# Patient Record
Sex: MALE
[De-identification: names, ages, dates, MRNs, and addresses within clinical notes are randomized per-mention and may not be internally consistent; named-entity substitution may affect disease eponyms.]

---

## 2022-07-30 ENCOUNTER — TELEPHONE ENCOUNTER (OUTPATIENT)
Age: 67
End: 2022-07-30

## 2022-07-31 ENCOUNTER — TELEPHONE ENCOUNTER (OUTPATIENT)
Age: 67
End: 2022-07-31

## 2022-11-30 ENCOUNTER — TELEPHONE ENCOUNTER (OUTPATIENT)
Dept: URBAN - METROPOLITAN AREA CLINIC 7 | Facility: CLINIC | Age: 67
End: 2022-11-30

## 2022-12-05 ENCOUNTER — LAB OUTSIDE AN ENCOUNTER (OUTPATIENT)
Dept: URBAN - METROPOLITAN AREA CLINIC 7 | Facility: CLINIC | Age: 67
End: 2022-12-05

## 2022-12-16 ENCOUNTER — TELEPHONE ENCOUNTER (OUTPATIENT)
Dept: URBAN - METROPOLITAN AREA CLINIC 7 | Facility: CLINIC | Age: 67
End: 2022-12-16

## 2023-01-06 ENCOUNTER — OFFICE VISIT (OUTPATIENT)
Dept: URBAN - METROPOLITAN AREA SURGERY CENTER 5 | Facility: SURGERY CENTER | Age: 68
End: 2023-01-06

## 2023-01-19 ENCOUNTER — OFFICE VISIT (OUTPATIENT)
Dept: URBAN - METROPOLITAN AREA SURGERY CENTER 5 | Facility: SURGERY CENTER | Age: 68
End: 2023-01-19

## 2023-01-31 ENCOUNTER — TELEPHONE ENCOUNTER (OUTPATIENT)
Dept: URBAN - METROPOLITAN AREA CLINIC 7 | Facility: CLINIC | Age: 68
End: 2023-01-31

## 2023-02-09 ENCOUNTER — OFFICE VISIT (OUTPATIENT)
Dept: URBAN - METROPOLITAN AREA SURGERY CENTER 5 | Facility: SURGERY CENTER | Age: 68
End: 2023-02-09

## 2023-03-22 ENCOUNTER — TELEPHONE ENCOUNTER (OUTPATIENT)
Dept: URBAN - METROPOLITAN AREA CLINIC 7 | Facility: CLINIC | Age: 68
End: 2023-03-22

## 2023-03-30 ENCOUNTER — OFFICE VISIT (OUTPATIENT)
Dept: URBAN - METROPOLITAN AREA SURGERY CENTER 5 | Facility: SURGERY CENTER | Age: 68
End: 2023-03-30

## 2023-07-17 ENCOUNTER — TELEPHONE ENCOUNTER (OUTPATIENT)
Dept: URBAN - METROPOLITAN AREA CLINIC 7 | Facility: CLINIC | Age: 68
End: 2023-07-17

## 2023-08-03 ENCOUNTER — TELEPHONE ENCOUNTER (OUTPATIENT)
Dept: URBAN - METROPOLITAN AREA CLINIC 7 | Facility: CLINIC | Age: 68
End: 2023-08-03

## 2024-10-01 ENCOUNTER — HOSPITAL ENCOUNTER (EMERGENCY)
Facility: HOSPITAL | Age: 69
Discharge: HOME | End: 2024-10-01
Attending: STUDENT IN AN ORGANIZED HEALTH CARE EDUCATION/TRAINING PROGRAM | Admitting: STUDENT IN AN ORGANIZED HEALTH CARE EDUCATION/TRAINING PROGRAM
Payer: COMMERCIAL

## 2024-10-01 ENCOUNTER — APPOINTMENT (EMERGENCY)
Dept: RADIOLOGY | Facility: HOSPITAL | Age: 69
End: 2024-10-01
Attending: STUDENT IN AN ORGANIZED HEALTH CARE EDUCATION/TRAINING PROGRAM
Payer: COMMERCIAL

## 2024-10-01 VITALS
HEART RATE: 98 BPM | TEMPERATURE: 99.3 F | DIASTOLIC BLOOD PRESSURE: 64 MMHG | OXYGEN SATURATION: 16 % | RESPIRATION RATE: 20 BRPM | SYSTOLIC BLOOD PRESSURE: 148 MMHG

## 2024-10-01 DIAGNOSIS — N39.0 URINARY TRACT INFECTION WITH HEMATURIA, SITE UNSPECIFIED: Primary | ICD-10-CM

## 2024-10-01 DIAGNOSIS — R31.9 URINARY TRACT INFECTION WITH HEMATURIA, SITE UNSPECIFIED: Primary | ICD-10-CM

## 2024-10-01 LAB
ALBUMIN SERPL-MCNC: 4.4 G/DL (ref 3.5–5.7)
ALP SERPL-CCNC: 46 IU/L (ref 34–125)
ALT SERPL-CCNC: 20 IU/L (ref 7–52)
AMPHET UR QL SCN: NOT DETECTED
ANION GAP SERPL CALC-SCNC: 7 MEQ/L (ref 3–15)
APAP SERPL-MCNC: <0.1 UG/ML (ref 10–30)
APTT PPP: <22 SEC (ref 23–35)
AST SERPL-CCNC: 20 IU/L (ref 13–39)
BACTERIA URNS QL MICRO: ABNORMAL /HPF
BARBITURATES UR QL SCN: NOT DETECTED
BASOPHILS # BLD: 0.02 K/UL (ref 0.01–0.1)
BASOPHILS NFR BLD: 0.2 %
BENZODIAZ UR QL SCN: NOT DETECTED
BILIRUB SERPL-MCNC: 0.6 MG/DL (ref 0.3–1.2)
BILIRUB UR QL STRIP.AUTO: NEGATIVE MG/DL
BUN SERPL-MCNC: 17 MG/DL (ref 7–25)
CALCIUM SERPL-MCNC: 9.2 MG/DL (ref 8.6–10.3)
CANNABINOIDS UR QL SCN: NOT DETECTED
CHLORIDE SERPL-SCNC: 103 MEQ/L (ref 98–107)
CLARITY UR REFRACT.AUTO: ABNORMAL
CO2 SERPL-SCNC: 26 MEQ/L (ref 21–31)
COCAINE UR QL SCN: NOT DETECTED
COLOR UR AUTO: ABNORMAL
CREAT SERPL-MCNC: 1 MG/DL (ref 0.7–1.3)
DIFFERENTIAL METHOD BLD: ABNORMAL
EGFRCR SERPLBLD CKD-EPI 2021: >60 ML/MIN/1.73M*2
EOSINOPHIL # BLD: 0.1 K/UL (ref 0.04–0.54)
EOSINOPHIL NFR BLD: 1 %
ERYTHROCYTE [DISTWIDTH] IN BLOOD BY AUTOMATED COUNT: 13.1 % (ref 11.6–14.4)
ETHANOL SERPL-MCNC: <10 MG/DL
FENTANYL URINE SCR: NOT DETECTED
FLUAV RNA SPEC QL NAA+PROBE: NEGATIVE
FLUBV RNA SPEC QL NAA+PROBE: NEGATIVE
GLUCOSE BLD-MCNC: 122 MG/DL (ref 70–99)
GLUCOSE SERPL-MCNC: 117 MG/DL (ref 70–99)
GLUCOSE UR STRIP.AUTO-MCNC: NEGATIVE MG/DL
HCT VFR BLD AUTO: 40.1 % (ref 40.1–51)
HGB BLD-MCNC: 13.3 G/DL (ref 13.7–17.5)
HGB UR QL STRIP.AUTO: 3
HYALINE CASTS #/AREA URNS LPF: ABNORMAL /LPF
IMM GRANULOCYTES # BLD AUTO: 0.05 K/UL (ref 0–0.08)
IMM GRANULOCYTES NFR BLD AUTO: 0.5 %
INR PPP: 0.9
KETONES UR STRIP.AUTO-MCNC: NEGATIVE MG/DL
LACTATE SERPL-SCNC: 1.3 MMOL/L (ref 0.4–2)
LEUKOCYTE ESTERASE UR QL STRIP.AUTO: 3
LIPASE SERPL-CCNC: 12 U/L (ref 11–82)
LYMPHOCYTES # BLD: 1.04 K/UL (ref 1.2–3.5)
LYMPHOCYTES NFR BLD: 10.9 %
MAGNESIUM SERPL-MCNC: 1.9 MG/DL (ref 1.8–2.5)
MCH RBC QN AUTO: 30 PG (ref 28–33.2)
MCHC RBC AUTO-ENTMCNC: 33.2 G/DL (ref 32.2–36.5)
MCV RBC AUTO: 90.5 FL (ref 83–98)
MONOCYTES # BLD: 0.03 K/UL (ref 0.3–1)
MONOCYTES NFR BLD: 0.3 %
MUCOUS THREADS URNS QL MICRO: ABNORMAL /LPF
NEUTROPHILS # BLD: 8.32 K/UL (ref 1.7–7)
NEUTS SEG NFR BLD: 87.1 %
NITRITE UR QL STRIP.AUTO: NEGATIVE
NRBC BLD-RTO: 0 %
OPIATES UR QL SCN: NOT DETECTED
PCP UR QL SCN: NOT DETECTED
PDW BLD AUTO: 9.5 FL (ref 9.4–12.4)
PH UR STRIP.AUTO: 7.5 [PH]
PLATELET # BLD AUTO: 243 K/UL (ref 150–350)
POCT TEST: ABNORMAL
POTASSIUM SERPL-SCNC: 3.9 MEQ/L (ref 3.5–5.1)
PROT SERPL-MCNC: 7.1 G/DL (ref 6–8.2)
PROT UR QL STRIP.AUTO: 1
PROTHROMBIN TIME: 12.5 SEC (ref 12.2–14.5)
RBC # BLD AUTO: 4.43 M/UL (ref 4.5–5.8)
RBC #/AREA URNS HPF: ABNORMAL /HPF
RSV RNA SPEC QL NAA+PROBE: NEGATIVE
SALICYLATES SERPL-MCNC: <1.5 MG/DL
SARS-COV-2 RNA RESP QL NAA+PROBE: NEGATIVE
SODIUM SERPL-SCNC: 136 MEQ/L (ref 136–145)
SP GR UR REFRACT.AUTO: 1.01
SQUAMOUS URNS QL MICRO: ABNORMAL /HPF
TROPONIN I SERPL HS-MCNC: 12.1 PG/ML
TROPONIN I SERPL HS-MCNC: 14 PG/ML
UROBILINOGEN UR STRIP-ACNC: 0.2 EU/DL
WBC # BLD AUTO: 9.56 K/UL (ref 3.8–10.5)
WBC #/AREA URNS HPF: ABNORMAL /HPF

## 2024-10-01 PROCEDURE — 96366 THER/PROPH/DIAG IV INF ADDON: CPT

## 2024-10-01 PROCEDURE — 83690 ASSAY OF LIPASE: CPT | Performed by: STUDENT IN AN ORGANIZED HEALTH CARE EDUCATION/TRAINING PROGRAM

## 2024-10-01 PROCEDURE — 80053 COMPREHEN METABOLIC PANEL: CPT | Performed by: STUDENT IN AN ORGANIZED HEALTH CARE EDUCATION/TRAINING PROGRAM

## 2024-10-01 PROCEDURE — 93005 ELECTROCARDIOGRAM TRACING: CPT | Performed by: STUDENT IN AN ORGANIZED HEALTH CARE EDUCATION/TRAINING PROGRAM

## 2024-10-01 PROCEDURE — 83735 ASSAY OF MAGNESIUM: CPT | Performed by: STUDENT IN AN ORGANIZED HEALTH CARE EDUCATION/TRAINING PROGRAM

## 2024-10-01 PROCEDURE — 84484 ASSAY OF TROPONIN QUANT: CPT | Mod: 91 | Performed by: STUDENT IN AN ORGANIZED HEALTH CARE EDUCATION/TRAINING PROGRAM

## 2024-10-01 PROCEDURE — 71045 X-RAY EXAM CHEST 1 VIEW: CPT

## 2024-10-01 PROCEDURE — 85610 PROTHROMBIN TIME: CPT | Performed by: STUDENT IN AN ORGANIZED HEALTH CARE EDUCATION/TRAINING PROGRAM

## 2024-10-01 PROCEDURE — 84484 ASSAY OF TROPONIN QUANT: CPT | Performed by: STUDENT IN AN ORGANIZED HEALTH CARE EDUCATION/TRAINING PROGRAM

## 2024-10-01 PROCEDURE — 36415 COLL VENOUS BLD VENIPUNCTURE: CPT | Performed by: STUDENT IN AN ORGANIZED HEALTH CARE EDUCATION/TRAINING PROGRAM

## 2024-10-01 PROCEDURE — 83605 ASSAY OF LACTIC ACID: CPT | Performed by: STUDENT IN AN ORGANIZED HEALTH CARE EDUCATION/TRAINING PROGRAM

## 2024-10-01 PROCEDURE — G0480 DRUG TEST DEF 1-7 CLASSES: HCPCS | Performed by: STUDENT IN AN ORGANIZED HEALTH CARE EDUCATION/TRAINING PROGRAM

## 2024-10-01 PROCEDURE — 25800000 HC PHARMACY IV SOLUTIONS: Performed by: STUDENT IN AN ORGANIZED HEALTH CARE EDUCATION/TRAINING PROGRAM

## 2024-10-01 PROCEDURE — 87077 CULTURE AEROBIC IDENTIFY: CPT | Performed by: STUDENT IN AN ORGANIZED HEALTH CARE EDUCATION/TRAINING PROGRAM

## 2024-10-01 PROCEDURE — 3E0337Z INTRODUCTION OF ELECTROLYTIC AND WATER BALANCE SUBSTANCE INTO PERIPHERAL VEIN, PERCUTANEOUS APPROACH: ICD-10-PCS | Performed by: STUDENT IN AN ORGANIZED HEALTH CARE EDUCATION/TRAINING PROGRAM

## 2024-10-01 PROCEDURE — 96365 THER/PROPH/DIAG IV INF INIT: CPT

## 2024-10-01 PROCEDURE — 87040 BLOOD CULTURE FOR BACTERIA: CPT | Mod: 91 | Performed by: STUDENT IN AN ORGANIZED HEALTH CARE EDUCATION/TRAINING PROGRAM

## 2024-10-01 PROCEDURE — 3E033GC INTRODUCTION OF OTHER THERAPEUTIC SUBSTANCE INTO PERIPHERAL VEIN, PERCUTANEOUS APPROACH: ICD-10-PCS | Performed by: STUDENT IN AN ORGANIZED HEALTH CARE EDUCATION/TRAINING PROGRAM

## 2024-10-01 PROCEDURE — 85025 COMPLETE CBC W/AUTO DIFF WBC: CPT | Performed by: STUDENT IN AN ORGANIZED HEALTH CARE EDUCATION/TRAINING PROGRAM

## 2024-10-01 PROCEDURE — 85730 THROMBOPLASTIN TIME PARTIAL: CPT | Performed by: STUDENT IN AN ORGANIZED HEALTH CARE EDUCATION/TRAINING PROGRAM

## 2024-10-01 PROCEDURE — 3E03329 INTRODUCTION OF OTHER ANTI-INFECTIVE INTO PERIPHERAL VEIN, PERCUTANEOUS APPROACH: ICD-10-PCS | Performed by: STUDENT IN AN ORGANIZED HEALTH CARE EDUCATION/TRAINING PROGRAM

## 2024-10-01 PROCEDURE — 3E0333Z INTRODUCTION OF ANTI-INFLAMMATORY INTO PERIPHERAL VEIN, PERCUTANEOUS APPROACH: ICD-10-PCS | Performed by: STUDENT IN AN ORGANIZED HEALTH CARE EDUCATION/TRAINING PROGRAM

## 2024-10-01 PROCEDURE — 96361 HYDRATE IV INFUSION ADD-ON: CPT

## 2024-10-01 PROCEDURE — 96375 TX/PRO/DX INJ NEW DRUG ADDON: CPT

## 2024-10-01 PROCEDURE — 87637 SARSCOV2&INF A&B&RSV AMP PRB: CPT | Performed by: STUDENT IN AN ORGANIZED HEALTH CARE EDUCATION/TRAINING PROGRAM

## 2024-10-01 PROCEDURE — 99284 EMERGENCY DEPT VISIT MOD MDM: CPT | Mod: 25

## 2024-10-01 PROCEDURE — 63600000 HC DRUGS/DETAIL CODE: Performed by: STUDENT IN AN ORGANIZED HEALTH CARE EDUCATION/TRAINING PROGRAM

## 2024-10-01 PROCEDURE — 80307 DRUG TEST PRSMV CHEM ANLYZR: CPT | Performed by: STUDENT IN AN ORGANIZED HEALTH CARE EDUCATION/TRAINING PROGRAM

## 2024-10-01 PROCEDURE — 63700000 HC SELF-ADMINISTRABLE DRUG: Performed by: STUDENT IN AN ORGANIZED HEALTH CARE EDUCATION/TRAINING PROGRAM

## 2024-10-01 PROCEDURE — 81001 URINALYSIS AUTO W/SCOPE: CPT | Mod: 59 | Performed by: STUDENT IN AN ORGANIZED HEALTH CARE EDUCATION/TRAINING PROGRAM

## 2024-10-01 RX ORDER — KETOROLAC TROMETHAMINE 15 MG/ML
15 INJECTION, SOLUTION INTRAMUSCULAR; INTRAVENOUS ONCE
Status: COMPLETED | OUTPATIENT
Start: 2024-10-01 | End: 2024-10-01

## 2024-10-01 RX ORDER — ROSUVASTATIN CALCIUM 10 MG/1
10 TABLET, COATED ORAL DAILY
COMMUNITY

## 2024-10-01 RX ORDER — VERAPAMIL HCL 240 MG
360 TABLET, EXTENDED RELEASE ORAL NIGHTLY
COMMUNITY

## 2024-10-01 RX ORDER — LORAZEPAM 2 MG/ML
1 INJECTION INTRAMUSCULAR ONCE
Status: DISCONTINUED | OUTPATIENT
Start: 2024-10-01 | End: 2024-10-01

## 2024-10-01 RX ORDER — VALSARTAN AND HYDROCHLOROTHIAZIDE 320; 25 MG/1; MG/1
1 TABLET, FILM COATED ORAL DAILY
COMMUNITY

## 2024-10-01 RX ORDER — CEFUROXIME AXETIL 500 MG/1
500 TABLET ORAL 2 TIMES DAILY
Qty: 14 TABLET | Refills: 0 | Status: SHIPPED | OUTPATIENT
Start: 2024-10-01 | End: 2024-10-08

## 2024-10-01 RX ORDER — HYDROCHLOROTHIAZIDE 12.5 MG/1
12.5 CAPSULE ORAL DAILY
COMMUNITY

## 2024-10-01 RX ORDER — LORAZEPAM 2 MG/ML
0.5 INJECTION INTRAMUSCULAR ONCE
Status: COMPLETED | OUTPATIENT
Start: 2024-10-01 | End: 2024-10-01

## 2024-10-01 RX ORDER — ACETAMINOPHEN 325 MG/1
975 TABLET ORAL ONCE
Status: COMPLETED | OUTPATIENT
Start: 2024-10-01 | End: 2024-10-01

## 2024-10-01 RX ADMIN — SODIUM CHLORIDE 1000 ML: 9 INJECTION, SOLUTION INTRAVENOUS at 19:18

## 2024-10-01 RX ADMIN — LORAZEPAM 0.5 MG: 2 INJECTION INTRAMUSCULAR; INTRAVENOUS at 19:27

## 2024-10-01 RX ADMIN — KETOROLAC TROMETHAMINE 15 MG: 15 INJECTION, SOLUTION INTRAMUSCULAR; INTRAVENOUS at 20:30

## 2024-10-01 RX ADMIN — ACETAMINOPHEN 975 MG: 325 TABLET ORAL at 18:32

## 2024-10-01 RX ADMIN — CEFTRIAXONE SODIUM 1 G: 1 INJECTION, POWDER, FOR SOLUTION INTRAMUSCULAR; INTRAVENOUS at 20:29

## 2024-10-01 ASSESSMENT — ENCOUNTER SYMPTOMS
MYALGIAS: 0
LIGHT-HEADEDNESS: 1
DIZZINESS: 0
HEADACHES: 1
COUGH: 0
FEVER: 1
EYE REDNESS: 0
ADENOPATHY: 0
NECK PAIN: 0
PALPITATIONS: 0
BRUISES/BLEEDS EASILY: 0
DYSURIA: 0
NAUSEA: 0
EYE PAIN: 0
AGITATION: 0
TREMORS: 1
ABDOMINAL PAIN: 0
HEMATURIA: 1
SORE THROAT: 0
CHILLS: 1
SHORTNESS OF BREATH: 0
BACK PAIN: 0

## 2024-10-01 NOTE — ED PROVIDER NOTES
"Emergency Medicine Note  HPI   HISTORY OF PRESENT ILLNESS     68M comes to ED from [facility] via personal vehicle with feeling lightheaded and shaking. Pt provides HPI. States this morning he did have breakfast then he saw his urologist, Dr. Bobby Estevez, for cystoscopy in the office (no anesthesia) secondary to known BPH. Pt has been urinating blood but was told that would be expected. Pt was given Rx antibiotic Doxycycline but states he did not fill the prescription yet. States he did not eat lunch because of \"running around.\" Notes at 3pm did have a headache and took Ibuprofen at that time (did not check his temperature at that time). Was at dinner at [facility] with family when felt acutely lightheaded with tremors so came to ED for evaluation. In triage it was noted to have an abnormal ECG but pt with active rigors. Noted to have a fever of 100.5F. Pt with lightheadedness but denies any other focal discomfort or pain.           Patient History   PAST HISTORY     Reviewed from Nursing Triage:  Allergies       Past Medical History:   Diagnosis Date    Hypertension        No past surgical history on file.    No family history on file.    Social History     Tobacco Use    Smoking status: Never    Smokeless tobacco: Never   Substance Use Topics    Alcohol use: Yes    Drug use: Never         Review of Systems   REVIEW OF SYSTEMS     Review of Systems   Constitutional:  Positive for chills and fever.   HENT:  Negative for ear pain and sore throat.    Eyes:  Negative for pain and redness.   Respiratory:  Negative for cough and shortness of breath.    Cardiovascular:  Negative for chest pain and palpitations.   Gastrointestinal:  Negative for abdominal pain and nausea.   Genitourinary:  Positive for hematuria. Negative for dysuria.   Musculoskeletal:  Negative for back pain, myalgias and neck pain.   Skin:  Negative for pallor and rash.   Neurological:  Positive for tremors (rigors), light-headedness and headaches. " Negative for dizziness.   Hematological:  Negative for adenopathy. Does not bruise/bleed easily.   Psychiatric/Behavioral:  Negative for agitation and behavioral problems.          VITALS     ED Vitals      Date/Time Temp Pulse Resp BP SpO2 [facility]   10/01/24 1830 -- -- -- 139/82 -- EKG   10/01/24 1817 38.1 °C (100.5 °F) 125 18 -- 98 % EKG          Pulse Ox %: 98 % (10/01/24 1817)  Pulse Ox Interpretation: Normal (10/01/24 1817)           Physical Exam   PHYSICAL EXAM     Physical Exam  Vitals and nursing note reviewed. Exam conducted with a chaperone present (RN).   Constitutional:       Appearance: Normal appearance. He is not toxic-appearing or diaphoretic.      Comments: Rigors present   HENT:      Head: Normocephalic and atraumatic.      Right Ear: External ear normal.      Left Ear: External ear normal.      Nose: Nose normal.      Mouth/Throat:      Mouth: Mucous membranes are moist.   Eyes:      Extraocular Movements: Extraocular movements intact.      Conjunctiva/sclera: Conjunctivae normal.   Cardiovascular:      Rate and Rhythm: Regular rhythm. Tachycardia present.      Pulses: Normal pulses.      Heart sounds: Normal heart sounds. No murmur heard.  Pulmonary:      Effort: Pulmonary effort is normal. No respiratory distress.      Breath sounds: Normal breath sounds. No wheezing, rhonchi or rales.   Abdominal:      General: Abdomen is flat. There is no distension.      Palpations: Abdomen is soft.      Tenderness: There is no abdominal tenderness. There is no guarding or rebound.   Musculoskeletal:         General: No tenderness or signs of injury. Normal range of motion.      Cervical back: Normal range of motion. No rigidity.      Right lower leg: No edema.      Left lower leg: No edema.   Skin:     General: Skin is warm and dry.      Coloration: Skin is not pale.      Findings: No erythema or rash.   Neurological:      General: No focal deficit present.      Mental Status: He is alert. Mental status is at  baseline.   Psychiatric:         Mood and Affect: Mood normal.         Behavior: Behavior normal.           PROCEDURES     Procedures     DATA     Results       Procedure Component Value Units Date/Time    CBC and differential [[id number]]  (Abnormal) Collected: 10/01/24 1824    Specimen: Blood, Venous Updated: 10/01/24 1850     WBC 9.56 K/uL      RBC 4.43 M/uL      Hemoglobin 13.3 g/dL      Hematocrit 40.1 %      MCV 90.5 fL      MCH 30.0 pg      MCHC 33.2 g/dL      RDW 13.1 %      Platelets 243 K/uL      MPV 9.5 fL      Differential Type Auto     nRBC 0.0 %      Immature Granulocytes 0.5 %      Neutrophils 87.1 %      Lymphocytes 10.9 %      Monocytes 0.3 %      Eosinophils 1.0 %      Basophils 0.2 %      Immature Granulocytes, Absolute 0.05 K/uL      Neutrophils, Absolute 8.32 K/uL      Lymphocytes, Absolute 1.04 K/uL      Monocytes, Absolute 0.03 K/uL      Eosinophils, Absolute 0.10 K/uL      Basophils, Absolute 0.02 K/uL     Lactate, w/ reflex repeat if > 2.0 [[id number]]  (Normal) Collected: 10/01/24 1826    Specimen: Blood, Venous Updated: 10/01/24 1849     Lactate 1.3 mmol/L     UA w/ reflex culture (ED Only) [[id number]] Collected: 10/01/24 1845    Specimen: Urine, Clean Catch Updated: 10/01/24 1849    Narrative:      The following orders were created for panel order UA w/ reflex culture (ED Only).  Procedure                               Abnormality         Status                     ---------                               -----------         ------                     UA Reflex to Culture (Ma...[[id number]]                      In process                   Please view results for these tests on the individual orders.    UA Reflex to Culture (Macroscopic) [[id number]] Collected: 10/01/24 1845    Specimen: Urine, Clean Catch Updated: 10/01/24 1849    Urine drug screen (UDS) [[id number]] Collected: 10/01/24 1845    Specimen: Urine, Clean Catch Updated: 10/01/24 1849    SARS-COV-2 (COVID-19)/ FLU A/B, AND RSV, PCR  Nasopharynx [[id number]] Collected: 10/01/24 1825    Specimen: Nasopharyngeal Swab from Nasopharynx Updated: 10/01/24 1846    Blood Culture Blood, Venous [[id number]] Collected: 10/01/24 1826    Specimen: Blood, Venous Updated: 10/01/24 1846    HS Troponin (with 2 hour reflex) [[id number]] Collected: 10/01/24 1824    Specimen: Blood, Venous Updated: 10/01/24 1846    PTT [[id number]] Collected: 10/01/24 1824    Specimen: Blood, Venous Updated: 10/01/24 1846    Protime-INR [[id number]] Collected: 10/01/24 1824    Specimen: Blood, Venous Updated: 10/01/24 1846    Comprehensive metabolic panel [[id number]] Collected: 10/01/24 1824    Specimen: Blood, Venous Updated: 10/01/24 1846    Lipase [[id number]] Collected: 10/01/24 1824    Specimen: Blood, Venous Updated: 10/01/24 1846    Magnesium [[id number]] Collected: 10/01/24 1824    Specimen: Blood, Venous Updated: 10/01/24 1846    ER toxicology screen, serum [[id number]] Collected: 10/01/24 1824    Specimen: Blood, Venous Updated: 10/01/24 1846            Imaging Results    None         ECG 12 lead   Independent Interpretation by ED Provider   ED attending interpreted ECG;   ECG 18:12 - artifact present from pt active rigors; suspect st 126 bpm, nl axis, good rwp, nonspecific st/t wave changes on basis of tachycardia and rigors, qt/qtc 366/530 ms.         ECG 12 lead    (Results Pending)       Scoring tools                                  ED Course & MDM   MDM / ED COURSE / CLINICAL IMPRESSION / DISPO     Medical Decision Making  Problems Addressed:  Urinary tract infection with hematuria, site unspecified: complicated acute illness or injury    Amount and/or Complexity of Data Reviewed  External Data Reviewed: labs and notes.  Labs: ordered. Decision-making details documented in ED Course.  Radiology: ordered and independent interpretation performed. Decision-making details documented in ED Course.  ECG/medicine tests: ordered and independent interpretation performed.  Decision-making details documented in ED Course.    Risk  OTC drugs.  Prescription drug management.        ED Course as of 10/01/24 2147   Tue Oct 01, 2024   1812 ED attending interpreted ECG;   ECG 18:12 - artifact present from pt active rigors; suspect st 126 bpm, nl axis, good rwp, nonspecific st/t wave changes on basis of tachycardia and rigors, qt/qtc 366/530 ms.  [NS]   1815 Pt seen and evaluated in triage secondary to abnormal ECG; HPI reviewed; doubt ACS suspect infectious cause. Plan for Acetaminophen and Ativan 0.5 mg and repeat ECG when rigors subside. IVF. Await additional results. Treat accordingly.  [NS]   1851 CBC and differential(!)  Reviewed; no leukocytosis.  [NS]   1851 Lactate: 1.3  WNL [NS]   1917 ED attending interpreted ECG;   Repeat ECG 19:17 - st 119 bpm, LPFB, nl axis, good rwp, no st/t wave changes, qt/qtc 344/483 ms.  [NS]   1930 X-ray(s) visualized by me, my independent interpretation:   NAD [NS]   1931 SARS-COV-2 (COVID-19)/ FLU A/B, AND RSV, PCR Nasopharynx  Negative [NS]   1931 Comprehensive metabolic panel(!)  WNL [NS]   1931 Magnesium: 1.9  WNL   [NS]   1931 UA w/ reflex culture (ED Only)(!)  Urine appears infected; will give dose of Rocephin in ED.  [NS]   1934 Pt appears improved. Updated on treatment plan. Will reassess after abx and repeat trop. Spouse now at bedside.  [NS]   2036 RN notified for repeat trop; will be collected now.  [NS]   2138 High Sens Troponin I: 14.0  no delta [NS]   2138 Pt re-evaluated. Appears improved. Still with mild headache. Overall well appearing. Discussed options for hospitalization for IV abx pending culture vs dc home with oral abx rx. Pt opts for dc home. Rx provided. Discharged into care of spouse. Return if worse or for any other concerns.  [NS]      ED Course User Index  [NS] Santi Benz,      Clinical Impression      None                 Santi Benz,   10/01/24 1858       Santi Benz,   10/01/24 2148       Santi Benz,  DO  10/01/24 2158

## 2024-10-02 NOTE — DISCHARGE INSTRUCTIONS
Your exam and treatment during this visit were done for an urgent/emergent reason. This visit does not replace complete and regular care from your family doctor.     Follow up with your primary care physician/family doctor in 1-3 days for repeat evaluation to ensure improvement and resolution of symptoms.     Follow up with your urologist as directed.     Follow up on pending blood and urine culture obtained during your emergency room visit. If there is a positive result you will be contacted by the emergency department.     Take antibiotic as prescribed. Take a probiotic as well to help prevent against gastrointestinal side effects (for example infectious diarrhea).     Return to the ER if worse (for example fever, concerning pain, chest pain, shortness of breath) or for any other concern. We are here to help you during times of emergency need.

## 2024-10-03 LAB
ATRIAL RATE: 119
BACTERIA UR CULT: ABNORMAL
BACTERIA UR CULT: ABNORMAL
P AXIS: 49
PR INTERVAL: 160
QRS DURATION: 88
QT INTERVAL: 344
QTC CALCULATION(BAZETT): 483
R AXIS: 110
T WAVE AXIS: 47
VENTRICULAR RATE: 119

## 2024-10-05 LAB — BACTERIA BLD CULT: NORMAL

## 2024-10-06 LAB — BACTERIA BLD CULT: NORMAL
